# Patient Record
Sex: MALE | URBAN - METROPOLITAN AREA
[De-identification: names, ages, dates, MRNs, and addresses within clinical notes are randomized per-mention and may not be internally consistent; named-entity substitution may affect disease eponyms.]

---

## 2022-05-23 ENCOUNTER — APPOINTMENT (RX ONLY)
Dept: URBAN - METROPOLITAN AREA CLINIC 10 | Facility: CLINIC | Age: 37
Setting detail: DERMATOLOGY
End: 2022-05-23

## 2022-05-23 DIAGNOSIS — L56.4 POLYMORPHOUS LIGHT ERUPTION: ICD-10-CM | Status: INADEQUATELY CONTROLLED

## 2022-05-23 PROBLEM — D23.39 OTHER BENIGN NEOPLASM OF SKIN OF OTHER PARTS OF FACE: Status: ACTIVE | Noted: 2022-05-23

## 2022-05-23 PROCEDURE — ? COUNSELING

## 2022-05-23 PROCEDURE — ? PRESCRIPTION

## 2022-05-23 PROCEDURE — ? TREATMENT REGIMEN

## 2022-05-23 PROCEDURE — 99204 OFFICE O/P NEW MOD 45 MIN: CPT

## 2022-05-23 RX ORDER — TRIAMCINOLONE ACETONIDE 1 MG/G
CREAM TOPICAL BID
Qty: 80 | Refills: 1 | Status: ERX | COMMUNITY
Start: 2022-05-23

## 2022-05-23 RX ADMIN — TRIAMCINOLONE ACETONIDE: 1 CREAM TOPICAL at 00:00

## 2022-05-23 ASSESSMENT — LOCATION SIMPLE DESCRIPTION DERM
LOCATION SIMPLE: LEFT PRETIBIAL REGION
LOCATION SIMPLE: RIGHT PRETIBIAL REGION

## 2022-05-23 ASSESSMENT — LOCATION ZONE DERM: LOCATION ZONE: LEG

## 2022-05-23 ASSESSMENT — LOCATION DETAILED DESCRIPTION DERM
LOCATION DETAILED: RIGHT PROXIMAL PRETIBIAL REGION
LOCATION DETAILED: LEFT PROXIMAL PRETIBIAL REGION

## 2022-05-23 NOTE — HPI: BLISTERS
How Severe Are Your Blisters?: severe
Please Check The Phrase That Best Describes Your Blisters.: focal
Is This A New Presentation, Or A Follow-Up?: Blisters

## 2022-05-23 NOTE — PROCEDURE: TREATMENT REGIMEN
Plan: Patient given a prescription of TAC cream to apply twice daily when rash occurs. We discussed that this will be a recurring problem with first intensive sun exposure every year. Recommend wearing clothing or sunblock with zinc or titanium to prevent exposure to visible and UV light.If he has a recurrence this summer I recommended he call and we will get them in 1 to 2 days for a biopsy. Today he had no active lesions. Photographs were reviewed.
Detail Level: Simple